# Patient Record
Sex: FEMALE | Race: WHITE | NOT HISPANIC OR LATINO | Employment: FULL TIME | URBAN - METROPOLITAN AREA
[De-identification: names, ages, dates, MRNs, and addresses within clinical notes are randomized per-mention and may not be internally consistent; named-entity substitution may affect disease eponyms.]

---

## 2024-01-29 ENCOUNTER — OFFICE VISIT (OUTPATIENT)
Dept: ORTHOPEDIC SURGERY | Facility: CLINIC | Age: 64
End: 2024-01-29
Payer: COMMERCIAL

## 2024-01-29 ENCOUNTER — HOSPITAL ENCOUNTER (OUTPATIENT)
Dept: RADIOLOGY | Facility: CLINIC | Age: 64
Discharge: HOME | End: 2024-01-29
Payer: COMMERCIAL

## 2024-01-29 DIAGNOSIS — M18.10 ARTHRITIS OF CARPOMETACARPAL (CMC) JOINT OF THUMB: ICD-10-CM

## 2024-01-29 PROCEDURE — L3924 HFO WITHOUT JOINTS PRE OTS: HCPCS | Performed by: ORTHOPAEDIC SURGERY

## 2024-01-29 PROCEDURE — 73140 X-RAY EXAM OF FINGER(S): CPT | Mod: RIGHT SIDE | Performed by: RADIOLOGY

## 2024-01-29 PROCEDURE — 2500000004 HC RX 250 GENERAL PHARMACY W/ HCPCS (ALT 636 FOR OP/ED): Performed by: ORTHOPAEDIC SURGERY

## 2024-01-29 PROCEDURE — 99204 OFFICE O/P NEW MOD 45 MIN: CPT | Performed by: ORTHOPAEDIC SURGERY

## 2024-01-29 PROCEDURE — 99214 OFFICE O/P EST MOD 30 MIN: CPT | Mod: 27 | Performed by: ORTHOPAEDIC SURGERY

## 2024-01-29 PROCEDURE — 73140 X-RAY EXAM OF FINGER(S): CPT | Mod: LEFT SIDE | Performed by: RADIOLOGY

## 2024-01-29 PROCEDURE — 20600 DRAIN/INJ JOINT/BURSA W/O US: CPT | Performed by: ORTHOPAEDIC SURGERY

## 2024-01-29 PROCEDURE — 1036F TOBACCO NON-USER: CPT | Performed by: ORTHOPAEDIC SURGERY

## 2024-01-29 PROCEDURE — 2500000005 HC RX 250 GENERAL PHARMACY W/O HCPCS: Performed by: ORTHOPAEDIC SURGERY

## 2024-01-29 PROCEDURE — 73140 X-RAY EXAM OF FINGER(S): CPT | Mod: LT

## 2024-01-29 PROCEDURE — 73140 X-RAY EXAM OF FINGER(S): CPT | Mod: RT

## 2024-01-29 RX ORDER — LIDOCAINE HYDROCHLORIDE 10 MG/ML
1 INJECTION INFILTRATION; PERINEURAL
Status: COMPLETED | OUTPATIENT
Start: 2024-01-29 | End: 2024-01-29

## 2024-01-29 RX ADMIN — TRIAMCINOLONE ACETONIDE 10 MG: 10 INJECTION, SUSPENSION INTRA-ARTICULAR; INTRALESIONAL at 09:47

## 2024-01-29 RX ADMIN — LIDOCAINE HYDROCHLORIDE 1 ML: 10 INJECTION, SOLUTION INFILTRATION; PERINEURAL at 09:47

## 2024-01-29 NOTE — PROGRESS NOTES
History present illness: Patient presents today for evaluation of bilateral basilar thumb pain.  She is an LPN.  She is right-hand dominant and otherwise healthy.  She describes profound pain and dysfunction.      Past medical history: The patient's past medical history, family history, social history, and review of systems were documented on the patient medical intake.  The updated data was reviewed in the electronic medical record.  History is negative except otherwise stated in history of present illness.        Physical examination:  General: Alert and oriented to person, place, and time.  No acute distress and breathing comfortably: Pleasant and cooperative with examination.  HEENT: Head is normocephalic and atraumatic.  Neck: Supple, no visible swelling.  Cardiovascular: No palpable tachycardia  Lungs: No audible wheezing or labored breathing  Abdomen: Nondistended.  Extremities: Evaluation of the bilateral upper extremities finds the patient had palpable radial artery at the wrists with brisk capillary refill to all digits.  Patient has intact sensation to axillary radial median and ulnar nerves.  There are no open wounds.  There are no signs of infection.  There is no evidence of lymphedema or lymphatic streaking.  The patient has supple compartments to bilateral arm forearm and hand.  Focal tenderness and swelling to bilateral thumb at CMC.      Radiology: X-rays demonstrate bilateral thumb CMC arthritis      Assessment: Bilateral thumb CMC arthritis      Plan: Treatment options were discussed.  Recommendations were made for steroid injection to bilateral thumb CMC joints and for intermittent bracing.  Plan for 6-week follow-up.  No x-rays necessary upon return.  Patient is agreeable with this strategy.        Procedure:                    Hand / UE Inj/Asp: bilateral thumb CMC for osteoarthritis on 1/29/2024 9:47 AM  Indications: pain  Details: 25 G needle, dorsal approach  Medications (Right): 10 mg  triamcinolone acetonide 10 mg/mL; 1 mL lidocaine 10 mg/mL (1 %)  Medications (Left): 1 mL lidocaine 10 mg/mL (1 %); 10 mg triamcinolone acetonide 10 mg/mL  Outcome: tolerated well, no immediate complications    Bilateral Thumb Carpometacarpal Joint Injection: It was explained to the patient that the risks of a steroid injection include but are not limited to infection, local skin irritation, skin atrophy, calcification, continued pain and discomfort, elevated blood sugar, burning, failure to relieve pain, and possible late infection. The patient verbalized good insight and verbalized consent for the injection. It was further explained that the postinjection discomfort can be alleviated with additional medications, ice, elevation, and rest over the first 24 hours, and that these modalities are recommended.    Using aseptic technique, a solution containing 0.5 cc of 5 mg of Kenalog and 0.5 mL of 1% lidocaine without epinephrine was injected intraarticularly to the right thumb carpometacarpal joint. Digital palpation was used to localize the CMC articulation about the dorsal radial aspect of the joint. Gentle traction was then imparted to the thumb distally and a 25-gauge needle was advanced through the skin, subcutaneous tissue and capsule. The injection was then administered and the patient tolerated the injection well. A band-aid was then placed. It should be noted that ethyl chloride spray was used to make the injection delivery more comfortable for the patient.    Using aseptic technique, a solution containing 0.5 cc of 5 mg of Kenalog and 0.5 mL of 1% lidocaine without epinephrine was injected intraarticularly to the left thumb carpometacarpal joint. Digital palpation was used to localize the CMC articulation about the dorsal radial aspect of the joint. Gentle traction was then imparted to the thumb distally and a 25-gauge needle was advanced through the skin, subcutaneous tissue and capsule. The injection was  then administered and the patient tolerated the injection well. A band-aid was then placed. It should be noted that ethyl chloride spray was used to make the injection delivery more comfortable for the patient.    Procedure, treatment alternatives, risks and benefits explained, specific risks discussed. Consent was given by the patient. Immediately prior to procedure a time out was called to verify the correct patient, procedure, equipment, support staff and site/side marked as required. Patient was prepped and draped in the usual sterile fashion.

## 2024-03-04 ENCOUNTER — OFFICE VISIT (OUTPATIENT)
Dept: ORTHOPEDIC SURGERY | Facility: CLINIC | Age: 64
End: 2024-03-04
Payer: COMMERCIAL

## 2024-03-04 DIAGNOSIS — M19.049 CMC ARTHRITIS: Primary | ICD-10-CM

## 2024-03-04 PROCEDURE — 99213 OFFICE O/P EST LOW 20 MIN: CPT | Performed by: ORTHOPAEDIC SURGERY

## 2024-03-04 PROCEDURE — 1036F TOBACCO NON-USER: CPT | Performed by: ORTHOPAEDIC SURGERY

## 2024-03-04 RX ORDER — METOPROLOL TARTRATE 25 MG/1
25 TABLET, FILM COATED ORAL 2 TIMES DAILY PRN
COMMUNITY
Start: 2022-08-17

## 2024-03-04 RX ORDER — ZINC GLUCONATE 50 MG
50 TABLET ORAL DAILY
COMMUNITY
Start: 2021-12-28

## 2024-03-04 RX ORDER — OMEPRAZOLE 40 MG/1
40 CAPSULE, DELAYED RELEASE ORAL
COMMUNITY
Start: 2018-01-16

## 2024-03-04 ASSESSMENT — PAIN SCALES - GENERAL: PAINLEVEL_OUTOF10: 4

## 2024-03-04 ASSESSMENT — PAIN - FUNCTIONAL ASSESSMENT: PAIN_FUNCTIONAL_ASSESSMENT: 0-10

## 2024-04-15 ENCOUNTER — OFFICE VISIT (OUTPATIENT)
Dept: ORTHOPEDIC SURGERY | Facility: CLINIC | Age: 64
End: 2024-04-15
Payer: COMMERCIAL

## 2024-04-15 DIAGNOSIS — M19.049 CMC ARTHRITIS: Primary | ICD-10-CM

## 2024-04-15 PROCEDURE — 99214 OFFICE O/P EST MOD 30 MIN: CPT | Performed by: ORTHOPAEDIC SURGERY

## 2024-04-15 PROCEDURE — 2500000005 HC RX 250 GENERAL PHARMACY W/O HCPCS: Performed by: ORTHOPAEDIC SURGERY

## 2024-04-15 PROCEDURE — 2500000004 HC RX 250 GENERAL PHARMACY W/ HCPCS (ALT 636 FOR OP/ED): Performed by: ORTHOPAEDIC SURGERY

## 2024-04-15 PROCEDURE — 1036F TOBACCO NON-USER: CPT | Performed by: ORTHOPAEDIC SURGERY

## 2024-04-15 PROCEDURE — 20600 DRAIN/INJ JOINT/BURSA W/O US: CPT | Performed by: ORTHOPAEDIC SURGERY

## 2024-04-15 RX ORDER — LIDOCAINE HYDROCHLORIDE 10 MG/ML
1 INJECTION INFILTRATION; PERINEURAL
Status: COMPLETED | OUTPATIENT
Start: 2024-04-15 | End: 2024-04-15

## 2024-04-15 RX ADMIN — LIDOCAINE HYDROCHLORIDE 1 ML: 10 INJECTION, SOLUTION INFILTRATION; PERINEURAL at 11:19

## 2024-04-15 RX ADMIN — TRIAMCINOLONE ACETONIDE 10 MG: 10 INJECTION, SUSPENSION INTRA-ARTICULAR; INTRALESIONAL at 11:19

## 2024-04-15 NOTE — PROGRESS NOTES
4/15/2024    Chief Complaint   Patient presents with    Left Hand - Follow-up     Lt thumb cmc s/p inj 1/29/24    Right Hand - Follow-up     Rt thumb cmc s/p inj 1/29/24       History of Present Illness:  Patient Sandy Garcia , 63 y.o. female, presents today, 4/15/2024, for evaluation of bilateral thumb pain and swelling.  Symptoms are relieved by rest and aggravated by use.  She states that she is inquiring about repeat bilateral thumb CMC injections today.  The last ones worked fairly well started to wear off around 6 weeks, and she was looking forward to repeating them around the 10-week nima.  She is considering surgical intervention but states now is not the right time, she would like to wait till the fall or early next year.  No new injury or trauma since last visit.       Review of Systems:   GENERAL: Negative  GI: Negative  MUSCULOSKELETAL: See HPI  SKIN: Negative  NEURO:  Negative     Physical Exam:  GENERAL:  Alert and oriented to person, place, and time.  No acute distress and breathing comfortably; pleasant and cooperative with the examination.  HEENT:  Head is normocephalic and atraumatic.  NECK:  Supple, no visible swelling.  CARDIOVASCULAR:  No palpable tachycardia.  LUNGS:  No audible wheezing or labored breathing.  ABDOMEN:  Nondistended.  Extremities: Evaluation of bilateral upper extremities finds the patient to have a palpable radial artery at the wrist with brisk capillary refill to all digits. The patient has intact sensorium to axillary, radial, median and ulnar nerves. There are no open wounds. There are no signs of infection. There is no evidence of lymphedema or lymphatic streaking. The patient has supple compartments of the bilateral arms, forearms and hands.  Tender to palpation over the bilateral thumbs with positive basilar grind test bilaterally.     Imaging/Test Results:  None today.     Assessment:  Bilateral thumb CMC osteoarthritis.     Plan:  Operative and nonoperative  treatment strategies were discussed at length.  Patient would like to forego surgical intervention in favor of continued nonoperative management at this time.  Recommendations are made for repeat Kenalog injections to the bilateral thumbs.  These were performed off today by Dr. Douglass and tolerated by patient.  Continue with activities to tolerance in the interim.  Continued bracing as needed.  Follow-up with our office again in 3 months for repeat clinical exam, no x-rays necessary upon return.  All questions answered at today's visit.    Hand / UE Inj/Asp: bilateral thumb CMC for osteoarthritis on 4/15/2024 11:19 AM  Indications: pain  Details: 25 G needle, dorsal approach  Medications (Right): 10 mg triamcinolone acetonide 10 mg/mL; 1 mL lidocaine 10 mg/mL (1 %)  Medications (Left): 1 mL lidocaine 10 mg/mL (1 %); 10 mg triamcinolone acetonide 10 mg/mL  Outcome: tolerated well, no immediate complications    Bilateral Thumb Carpometacarpal Joint Injection: It was explained to the patient that the risks of a steroid injection include but are not limited to infection, local skin irritation, skin atrophy, calcification, continued pain and discomfort, elevated blood sugar, burning, failure to relieve pain, and possible late infection. The patient verbalized good insight and verbalized consent for the injection. It was further explained that the postinjection discomfort can be alleviated with additional medications, ice, elevation, and rest over the first 24 hours, and that these modalities are recommended.    Using aseptic technique, a solution containing 0.5 cc of 5 mg of Kenalog and 0.5 mL of 1% lidocaine without epinephrine was injected intraarticularly to the right thumb carpometacarpal joint. Digital palpation was used to localize the CMC articulation about the dorsal radial aspect of the joint. Gentle traction was then imparted to the thumb distally and a 25-gauge needle was advanced through the skin,  subcutaneous tissue and capsule. The injection was then administered and the patient tolerated the injection well. A band-aid was then placed. It should be noted that ethyl chloride spray was used to make the injection delivery more comfortable for the patient.    Using aseptic technique, a solution containing 0.5 cc of 5 mg of Kenalog and 0.5 mL of 1% lidocaine without epinephrine was injected intraarticularly to the left thumb carpometacarpal joint. Digital palpation was used to localize the CMC articulation about the dorsal radial aspect of the joint. Gentle traction was then imparted to the thumb distally and a 25-gauge needle was advanced through the skin, subcutaneous tissue and capsule. The injection was then administered and the patient tolerated the injection well. A band-aid was then placed. It should be noted that ethyl chloride spray was used to make the injection delivery more comfortable for the patient.    Procedure, treatment alternatives, risks and benefits explained, specific risks discussed. Consent was given by the patient. Immediately prior to procedure a time out was called to verify the correct patient, procedure, equipment, support staff and site/side marked as required. Patient was prepped and draped in the usual sterile fashion.         In a face to face encounter, I performed a history and physical examination, discussed pertinent diagnostic studies if indicated, and discussed diagnosis and management strategies with both the patient and the mid-level provider. I reviewed the mid-level's note and agree with the documented findings and plan of care.  Patient presents today for evaluation of bilateral thumb CMC arthritis.  Focal tenderness to bilateral thumb CMC joints.  Positive basilar grind test bilaterally.  Treatment options were discussed.  We talked about operative and nonoperative strategies.  Patient elects for steroid injection and 3-month follow-up.  She is leaning toward surgical  solutions by end of the year.  No x-rays upon return to the office.

## 2024-07-06 NOTE — PROGRESS NOTES
3/4/2024    Chief Complaint   Patient presents with    Left Hand - Follow-up, Pain     Thumb cmc s/p inj 01/29/24    Right Hand - Follow-up, Pain     Thumb cmc s/p inj 01/29/24       History of Present Illness:  Patient Sandy Garcia , 63 y.o. female, presents today, 3/4/2024, for evaluation of bilateral thumb pain.  Sandy underwent bilateral thumb CMC injections about 6 weeks ago.  They did help with her pain and discomfort, she is wearing braces at home, but symptoms are incompletely resolved.  She states that motion is less painful, but anytime she puts direct pressure on the thumbs at these joints she still has discomfort.  She is considering surgical intervention at some point, she would not be able to get this coordinated until sometime next year due to the downtime required from work.       Review of Systems:   GENERAL: Negative  GI: Negative  MUSCULOSKELETAL: See HPI  SKIN: Negative  NEURO:  Negative     Physical Exam:  GENERAL:  Alert and oriented to person, place, and time.  No acute distress and breathing comfortably; pleasant and cooperative with the examination.  HEENT:  Head is normocephalic and atraumatic.  NECK:  Supple, no visible swelling.  CARDIOVASCULAR:  No palpable tachycardia.  LUNGS:  No audible wheezing or labored breathing.  ABDOMEN:  Nondistended.  Extremities: Evaluation of bilateral upper extremities finds the patient to have a palpable radial artery at the wrist with brisk capillary refill to all digits. The patient has intact sensorium to axillary, radial, median and ulnar nerves. There are no open wounds. There are no signs of infection. There is no evidence of lymphedema or lymphatic streaking. The patient has supple compartments of the bilateral arms, forearms and hands.  Tenderness over the bilateral thumb CMC articulations.     Imaging/Test Results:  None today.  Previous radiographs in the  PACS system show evidence of serial bilateral thumb CMC osteoarthritis.      Assessment:  Bilateral thumb CMC osteoarthritis with incomplete resolution of symptoms after injections.     Plan:  Operative and nonoperative treatment strategies were reviewed at length.  Patient elects for continued nonoperative management at this time.  We recommend for continued observation for now, bracing in the interim, and follow-up again in 6 weeks to discuss possibility of repeat injection at that time.  All questions answered at today's visit.    In a face to face encounter, I performed a history and physical examination, discussed pertinent diagnostic studies if indicated, and discussed diagnosis and management strategies with both the patient and the mid-level provider. I reviewed the mid-level's note and agree with the documented findings and plan of care.    Patient presents today for evaluation of symptomatic bilateral thumb CMC arthritis.  Previous injections helped but not enough.  Exam shows positive basilar grind test to the bilateral thumbs.  Treatment options were discussed.  Patient elects for 6-week follow-up for repeat trial of steroid injection.  She is considering surgical solutions but would not do so into the end of this year.  No x-rays upon return.   impaired

## 2024-07-15 ENCOUNTER — OFFICE VISIT (OUTPATIENT)
Dept: ORTHOPEDIC SURGERY | Facility: CLINIC | Age: 64
End: 2024-07-15
Payer: COMMERCIAL

## 2024-07-15 DIAGNOSIS — M19.049 CMC ARTHRITIS: Primary | ICD-10-CM

## 2024-07-15 PROCEDURE — 1036F TOBACCO NON-USER: CPT | Performed by: ORTHOPAEDIC SURGERY

## 2024-07-15 PROCEDURE — 99214 OFFICE O/P EST MOD 30 MIN: CPT | Performed by: ORTHOPAEDIC SURGERY

## 2024-07-15 PROCEDURE — 20600 DRAIN/INJ JOINT/BURSA W/O US: CPT | Mod: LT | Performed by: ORTHOPAEDIC SURGERY

## 2024-07-15 PROCEDURE — 2500000004 HC RX 250 GENERAL PHARMACY W/ HCPCS (ALT 636 FOR OP/ED): Performed by: ORTHOPAEDIC SURGERY

## 2024-07-15 PROCEDURE — 2500000005 HC RX 250 GENERAL PHARMACY W/O HCPCS: Performed by: ORTHOPAEDIC SURGERY

## 2024-07-15 PROCEDURE — 20600 DRAIN/INJ JOINT/BURSA W/O US: CPT | Mod: RT | Performed by: ORTHOPAEDIC SURGERY

## 2024-07-15 RX ORDER — LIDOCAINE HYDROCHLORIDE 10 MG/ML
0.5 INJECTION INFILTRATION; PERINEURAL
Status: COMPLETED | OUTPATIENT
Start: 2024-07-15 | End: 2024-07-15

## 2024-07-15 NOTE — PROGRESS NOTES
7/15/2024    Chief Complaint   Patient presents with    Left Hand - Pain, Osteoarthritis     Thumb cmc arthritis  S/p inj 5/14/24    Right Hand - Pain, Osteoarthritis     thumb cmc arthritis  S/p inj 5/14/24       History of Present Illness:  Patient Sandy Garcia , 63 y.o. female, presents today, 7/15/2024, for evaluation of bilateral thumb pain.  3 months out from the last thumb CMC injections bilaterally.  She is requesting repeat injection today.  Symptoms on the right are worse than on the left.  She is thoughtfully considering surgery at some point in the near future likely later this fall or winter near the end of the year.  She states now she is not the right time.  Denies any new injury or trauma.       Review of Systems:   GENERAL: Negative  GI: Negative  MUSCULOSKELETAL: See HPI  SKIN: Negative  NEURO:  Negative     Physical Exam:  GENERAL:  Alert and oriented to person, place, and time.  No acute distress and breathing comfortably; pleasant and cooperative with the examination.  HEENT:  Head is normocephalic and atraumatic.  NECK:  Supple, no visible swelling.  CARDIOVASCULAR:  No palpable tachycardia.  LUNGS:  No audible wheezing or labored breathing.  ABDOMEN:  Nondistended.  Extremities: Evaluation of bilateral upper extremities finds the patient to have a palpable radial artery at the wrist with brisk capillary refill to all digits. The patient has intact sensorium to axillary, radial, median and ulnar nerves. There are no open wounds. There are no signs of infection. There is no evidence of lymphedema or lymphatic streaking. The patient has supple compartments of the bilateral arms, forearms and hands.  She has tenderness palpation of the bilateral thumb CMC articulations with positive basilar grind test bilaterally.     Imaging/Test Results:  None today, previous radiographs showed significant bilateral thumb CMC osteoarthritic change.     Assessment:  Bilateral thumb CMC osteoarthritis.      Plan:  Treatment options were discussed including operative and nonoperative treatment strategies.  Patient is thoughtfully considering surgical intervention, but states now is not a good time.  She ultimately elects for repeat Kenalog injections today.  These were performed in office today by Dr. Douglass and tolerated well by patient.  Continue with activities to tolerance and bracing as needed in the interim.  Follow-up again in 3 months for repeat clinical exam.  We discussed possibility of repeat injection or moving forward with surgical intervention at that time.  All questions answered at today's visit.    Hand / UE Inj/Asp: R thumb CMC for osteoarthritis on 7/15/2024 9:18 AM  Indications: pain  Details: 25 G needle, dorsal approach  Medications: 5 mg triamcinolone acetonide 10 mg/mL; 0.5 mL lidocaine 10 mg/mL (1 %)  Outcome: tolerated well, no immediate complications    Right Thumb Carpometacarpal Joint Injection: It was explained to the patient that the risks of a steroid injection include but are not limited to infection, local skin irritation, skin atrophy, calcification, continued pain and discomfort, elevated blood sugar, burning, failure to relieve pain, and possible late infection. The patient verbalized good insight and verbalized consent for the injection. It was further explained that the postinjection discomfort can be alleviated with additional medications, ice, elevation, and rest over the first 24 hours, and that these modalities are recommended.    Using aseptic technique, a solution containing 0.5 cc of 5 mg of Kenalog and 0.5 mL of 1% lidocaine without epinephrine was injected intraarticularly to the right thumb carpometacarpal joint. Digital palpation was used to localize the CMC articulation about the dorsal radial aspect of the joint. Gentle traction was then imparted to the thumb distally and a 25-gauge needle was advanced through the skin, subcutaneous tissue and capsule. The injection  was then administered and the patient tolerated the injection well. A band-aid was then placed. It should be noted that ethyl chloride spray was used to make the injection delivery more comfortable for the patient.  Procedure, treatment alternatives, risks and benefits explained, specific risks discussed. Consent was given by the patient. Immediately prior to procedure a time out was called to verify the correct patient, procedure, equipment, support staff and site/side marked as required. Patient was prepped and draped in the usual sterile fashion.       Hand / UE Inj/Asp: L thumb CMC for osteoarthritis on 7/15/2024 9:19 AM  Indications: pain  Details: 25 G needle, dorsal approach  Medications: 5 mg triamcinolone acetonide 10 mg/mL; 0.5 mL lidocaine 10 mg/mL (1 %)  Outcome: tolerated well, no immediate complications    Left Thumb Carpometacarpal Joint Injection: It was explained to the patient that the risks of a steroid injection include but are not limited to infection, local skin irritation, skin atrophy, calcification, continued pain and discomfort, elevated blood sugar, burning, failure to relieve pain, and possible late infection. The patient verbalized good insight and verbalized consent for the injection. It was further explained that the postinjection discomfort can be alleviated with additional medications, ice, elevation, and rest over the first 24 hours, and that these modalities are recommended.    Using aseptic technique, a solution containing 0.5 cc of 5 mg of Kenalog and 0.5 mL of 1% lidocaine without epinephrine was injected intraarticularly to the left thumb carpometacarpal joint. Digital palpation was used to localize the CMC articulation about the dorsal radial aspect of the joint. Gentle traction was then imparted to the thumb distally and a 25-gauge needle was advanced through the skin, subcutaneous tissue and capsule. The injection was then administered and the patient tolerated the injection  well. A band-aid was then placed. It should be noted that ethyl chloride spray was used to make the injection delivery more comfortable for the patient.  Procedure, treatment alternatives, risks and benefits explained, specific risks discussed. Consent was given by the patient. Immediately prior to procedure a time out was called to verify the correct patient, procedure, equipment, support staff and site/side marked as required. Patient was prepped and draped in the usual sterile fashion.         In a face to face encounter, I performed a history and physical examination, discussed pertinent diagnostic studies if indicated, and discussed diagnosis and management strategies with both the patient and the mid-level provider. I reviewed the mid-level's note and agree with the documented findings and plan of care.  Patient presents today for evaluation of symptomatic bilateral thumb CMC arthritis.  Focal tenderness to bilateral thumb CMC joints on today's exam.  Treatment options were discussed.  We talked about operative and nonoperative strategies.  We talked about intraoperative techniques and postoperative protocols for CMC arthroplasty.  Patient is weighing her options.  She is considering surgery.  Patient elects for steroid injection to the bilateral thumb at CMC joint and follow-up with me in 3 months.  No x-rays upon return.

## 2024-08-19 ENCOUNTER — OFFICE VISIT (OUTPATIENT)
Dept: ORTHOPEDIC SURGERY | Facility: CLINIC | Age: 64
End: 2024-08-19
Payer: COMMERCIAL

## 2024-08-19 DIAGNOSIS — M19.049 CMC ARTHRITIS: ICD-10-CM

## 2024-08-19 DIAGNOSIS — M18.10 ARTHRITIS OF CARPOMETACARPAL (CMC) JOINT OF THUMB: Primary | ICD-10-CM

## 2024-08-19 PROCEDURE — 1036F TOBACCO NON-USER: CPT | Performed by: ORTHOPAEDIC SURGERY

## 2024-08-19 PROCEDURE — 99214 OFFICE O/P EST MOD 30 MIN: CPT | Performed by: ORTHOPAEDIC SURGERY

## 2024-08-19 NOTE — PROGRESS NOTES
History present illness: Patient presents today for evaluation of her bilateral thumb pain.  She wants to focus on the right today.  Previous steroid injection did not bring adequate relief.  She has pain and dysfunction on a daily basis.  Right-hand-dominant.  History of palpitations.      Past medical history: The patient's past medical history, family history, social history, and review of systems were documented on the patient medical intake.  The updated data was reviewed in the electronic medical record.  History is negative except otherwise stated in history of present illness.        Physical examination:  General: Alert and oriented to person, place, and time.  No acute distress and breathing comfortably: Pleasant and cooperative with examination.  HEENT: Head is normocephalic and atraumatic.  Neck: Supple, no visible swelling.  Cardiovascular: No palpable tachycardia  Lungs: No audible wheezing or labored breathing  Abdomen: Nondistended.  Extremities: Evaluation of the right upper extremity finds the patient had palpable radial artery at the wrist with brisk capillary refill to all digits.  Patient has intact sensation to axillary radial median and ulnar nerves.  There are no open wounds.  There are no signs of infection.  There is no evidence of lymphedema or lymphatic streaking.  The patient has supple compartments to right arm forearm and hand.  Focal tenderness right thumb at CMC articulation.  Positive basilar grind test.      Radiology:      Assessment: Severe right thumb CMC arthritis with failure of nonoperative treatment strategies      Plan: Treatment options were discussed.  We talked about operative and nonoperative strategies.  We talked about intraoperative techniques and postoperative protocols.  Patient elects to proceed forth with surgery by way of right thumb CMC arthroplasty.  She has a preference for Norco over Percocet for postoperative pain control.  We will seek clearance through  PCP and cardiologist.  Target for surgery is October 2024.        Procedure:

## 2024-08-22 ENCOUNTER — TELEPHONE (OUTPATIENT)
Dept: ORTHOPEDIC SURGERY | Facility: CLINIC | Age: 64
End: 2024-08-22
Payer: COMMERCIAL

## 2024-08-22 NOTE — TELEPHONE ENCOUNTER
Called patient to schedule recommended surgery with Dr. Douglass, call went to voicemail at which time a message was left. A AMEE message was also sent at this time.

## 2024-09-18 ENCOUNTER — LAB (OUTPATIENT)
Dept: LAB | Facility: LAB | Age: 64
End: 2024-09-18
Payer: COMMERCIAL

## 2024-09-18 ENCOUNTER — APPOINTMENT (OUTPATIENT)
Dept: CARDIOLOGY | Facility: CLINIC | Age: 64
End: 2024-09-18
Payer: COMMERCIAL

## 2024-09-18 VITALS — HEART RATE: 62 BPM

## 2024-09-18 DIAGNOSIS — Z01.818 PREOP TESTING: Primary | ICD-10-CM

## 2024-09-18 DIAGNOSIS — Z01.818 PRE-OP EXAM: ICD-10-CM

## 2024-09-18 LAB
ALBUMIN SERPL BCP-MCNC: 4.1 G/DL (ref 3.4–5)
ALP SERPL-CCNC: 72 U/L (ref 33–136)
ALT SERPL W P-5'-P-CCNC: 12 U/L (ref 7–45)
ANION GAP SERPL CALC-SCNC: 8 MMOL/L (ref 10–20)
AST SERPL W P-5'-P-CCNC: 18 U/L (ref 9–39)
BASOPHILS # BLD AUTO: 0.03 X10*3/UL (ref 0–0.1)
BASOPHILS NFR BLD AUTO: 0.6 %
BILIRUB SERPL-MCNC: 0.4 MG/DL (ref 0–1.2)
BUN SERPL-MCNC: 12 MG/DL (ref 6–23)
CALCIUM SERPL-MCNC: 9.2 MG/DL (ref 8.6–10.3)
CHLORIDE SERPL-SCNC: 104 MMOL/L (ref 98–107)
CO2 SERPL-SCNC: 30 MMOL/L (ref 21–32)
CREAT SERPL-MCNC: 0.63 MG/DL (ref 0.5–1.05)
EGFRCR SERPLBLD CKD-EPI 2021: >90 ML/MIN/1.73M*2
EOSINOPHIL # BLD AUTO: 0.12 X10*3/UL (ref 0–0.7)
EOSINOPHIL NFR BLD AUTO: 2.4 %
ERYTHROCYTE [DISTWIDTH] IN BLOOD BY AUTOMATED COUNT: 14.4 % (ref 11.5–14.5)
GLUCOSE SERPL-MCNC: 88 MG/DL (ref 74–99)
HCT VFR BLD AUTO: 36.9 % (ref 36–46)
HGB BLD-MCNC: 11.7 G/DL (ref 12–16)
IMM GRANULOCYTES # BLD AUTO: 0.01 X10*3/UL (ref 0–0.7)
IMM GRANULOCYTES NFR BLD AUTO: 0.2 % (ref 0–0.9)
LYMPHOCYTES # BLD AUTO: 2.01 X10*3/UL (ref 1.2–4.8)
LYMPHOCYTES NFR BLD AUTO: 40.9 %
MCH RBC QN AUTO: 29.3 PG (ref 26–34)
MCHC RBC AUTO-ENTMCNC: 31.7 G/DL (ref 32–36)
MCV RBC AUTO: 93 FL (ref 80–100)
MONOCYTES # BLD AUTO: 0.34 X10*3/UL (ref 0.1–1)
MONOCYTES NFR BLD AUTO: 6.9 %
NEUTROPHILS # BLD AUTO: 2.4 X10*3/UL (ref 1.2–7.7)
NEUTROPHILS NFR BLD AUTO: 49 %
NRBC BLD-RTO: 0 /100 WBCS (ref 0–0)
PLATELET # BLD AUTO: 286 X10*3/UL (ref 150–450)
POTASSIUM SERPL-SCNC: 4.3 MMOL/L (ref 3.5–5.3)
PROT SERPL-MCNC: 6.2 G/DL (ref 6.4–8.2)
RBC # BLD AUTO: 3.99 X10*6/UL (ref 4–5.2)
SODIUM SERPL-SCNC: 138 MMOL/L (ref 136–145)
WBC # BLD AUTO: 4.9 X10*3/UL (ref 4.4–11.3)

## 2024-09-18 PROCEDURE — 80053 COMPREHEN METABOLIC PANEL: CPT

## 2024-09-18 PROCEDURE — 85025 COMPLETE CBC W/AUTO DIFF WBC: CPT

## 2024-09-18 PROCEDURE — 36415 COLL VENOUS BLD VENIPUNCTURE: CPT

## 2024-09-18 NOTE — PROGRESS NOTES
Patient presented in office today for EKG visit ordered by Martha Mckeon PA-C for pre op exam (Rt thumb PO SX 10/4 ASC). EKG read by in office physician Dr. Naima Montano MD, [facility]. No previous EKG to compare. No orders given. EKG scanned into chart.

## 2024-10-02 DIAGNOSIS — G89.18 POST-OP PAIN: Primary | ICD-10-CM

## 2024-10-03 RX ORDER — TRAMADOL HYDROCHLORIDE 50 MG/1
50 TABLET ORAL EVERY 8 HOURS
Qty: 20 TABLET | Refills: 0 | Status: SHIPPED | OUTPATIENT
Start: 2024-10-03 | End: 2024-10-10

## 2024-10-04 PROCEDURE — 25310 TRANSPLANT FOREARM TENDON: CPT | Performed by: ORTHOPAEDIC SURGERY

## 2024-10-04 PROCEDURE — 25447 ARTHRP NTRCRP/CRP/MTCR NTRPS: CPT | Performed by: PHYSICIAN ASSISTANT

## 2024-10-04 PROCEDURE — 25447 ARTHRP NTRCRP/CRP/MTCR NTRPS: CPT | Performed by: ORTHOPAEDIC SURGERY

## 2024-10-04 PROCEDURE — 25310 TRANSPLANT FOREARM TENDON: CPT | Performed by: PHYSICIAN ASSISTANT

## 2024-10-17 ENCOUNTER — HOSPITAL ENCOUNTER (OUTPATIENT)
Dept: RADIOLOGY | Facility: CLINIC | Age: 64
Discharge: HOME | End: 2024-10-17
Payer: COMMERCIAL

## 2024-10-17 ENCOUNTER — OFFICE VISIT (OUTPATIENT)
Dept: ORTHOPEDIC SURGERY | Facility: CLINIC | Age: 64
End: 2024-10-17
Payer: COMMERCIAL

## 2024-10-17 ENCOUNTER — EVALUATION (OUTPATIENT)
Dept: OCCUPATIONAL THERAPY | Facility: CLINIC | Age: 64
End: 2024-10-17
Payer: COMMERCIAL

## 2024-10-17 DIAGNOSIS — M19.049 CMC ARTHRITIS: Primary | ICD-10-CM

## 2024-10-17 DIAGNOSIS — M19.049 CMC ARTHRITIS: ICD-10-CM

## 2024-10-17 DIAGNOSIS — M18.11 OSTEOARTHRITIS OF CARPOMETACARPAL (CMC) JOINT OF RIGHT THUMB: Primary | ICD-10-CM

## 2024-10-17 PROCEDURE — 73140 X-RAY EXAM OF FINGER(S): CPT | Mod: RIGHT SIDE | Performed by: ORTHOPAEDIC SURGERY

## 2024-10-17 PROCEDURE — 99024 POSTOP FOLLOW-UP VISIT: CPT | Performed by: ORTHOPAEDIC SURGERY

## 2024-10-17 PROCEDURE — 73140 X-RAY EXAM OF FINGER(S): CPT | Mod: RT

## 2024-10-17 PROCEDURE — 1036F TOBACCO NON-USER: CPT | Performed by: ORTHOPAEDIC SURGERY

## 2024-10-17 PROCEDURE — L3808 WHFO, RIGID W/O JOINTS: HCPCS | Performed by: OCCUPATIONAL THERAPIST

## 2024-10-17 PROCEDURE — 99211 OFF/OP EST MAY X REQ PHY/QHP: CPT | Performed by: ORTHOPAEDIC SURGERY

## 2024-10-17 PROCEDURE — 97165 OT EVAL LOW COMPLEX 30 MIN: CPT | Mod: GO | Performed by: OCCUPATIONAL THERAPIST

## 2024-10-17 ASSESSMENT — PAIN SCALES - GENERAL: PAINLEVEL_OUTOF10: 2

## 2024-10-17 ASSESSMENT — PAIN - FUNCTIONAL ASSESSMENT: PAIN_FUNCTIONAL_ASSESSMENT: 0-10

## 2024-10-17 ASSESSMENT — PAIN DESCRIPTION - DESCRIPTORS: DESCRIPTORS: SHARP

## 2024-10-17 NOTE — PROGRESS NOTES
10/17/2024    Chief Complaint   Patient presents with    Right Thumb - Post-op     Cmc arthroplasty  DOS: 10/4/24  Xrays today        History of Present Illness:  Patient Sandy Garcia , 63 y.o. female, presents today, 10/17/2024, for evaluation of right thumb  CMC arthroplasty, 2 weeks post-op .  She is done well over the last 2 weeks in surgery.  Denies any fevers, chills, constitutional symptoms.  She kept her postoperative splint on clean, dry, intact.  She has been icing and elevating.  Minimal amount of discomfort that she has been able to control well with pain medication.       Review of Systems:   GENERAL: Negative  GI: Negative  MUSCULOSKELETAL: See HPI  SKIN: Negative  NEURO:  Negative     Physical Exam:  GENERAL:  Alert and oriented to person, place, and time.  No acute distress and breathing comfortably; pleasant and cooperative with the examination.  HEENT:  Head is normocephalic and atraumatic.  NECK:  Supple, no visible swelling.  CARDIOVASCULAR:  No palpable tachycardia.  LUNGS:  No audible wheezing or labored breathing.  ABDOMEN:  Nondistended.  Extremities: The surgical incision is clean, dry, intact, and appears to be healing well.  No active bleeding, erythema, warmth, drainage, or signs of infection.  Appropriate functional ROM demonstrated with flexion/extension of the digits, and flexion/extension/pronosupination of the wrist.     Imaging/Test Results:  3 views of the thumb show no acute fracture or dislocation.  Previous surgical resection of trapezium is noted.  CMC arthroplasty construct has good alignment.  No evidence of metacarpal subsidence.     Assessment:  Right thumb CMC arthroplasty, 2 weeks postop.     Plan:  Sutures were removed in the office today.  The patient will have restrictions of nonweightbearing to the right upper extremity.  We discussed and reviewed home exercise program for range of motion recovery, scar massage, and desensitization techniques.  Occupational  Therapy referral order was provided for custom splint fabrication and CMC rehab protocols .  The patient will follow up with our office in 4 weeks, repeat x-rays 3 views of the right thumb upon return.  All patient questions answered at today's visit.    Martha Mckeon PA-C

## 2024-10-17 NOTE — PROGRESS NOTES
"Occupational Therapy    Occupational Therapy Evaluation    Name: Sandy Garcia  MRN: [id number]  : 1960   DATE: 10/17/24   Time Calculation  Start Time: 1105  Stop Time: 1145  Time Calculation (min): 40 min     OT Evaluation Time Entry  OT Evaluation (Low) Time Entry: 20        Insurance Authorization Request: N[employer] RETIREE 60V PT OT COPAY 0 DED 0,COVERAGE 100 OOP 0 NO AUTH REQ    Assessment:  Patient is a 63 y.o. female who is 13 days s/p right CMC arthroplasty surgery.     Patient presented with pain, edema, joint stiffness, and muscle weakness. 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 Meaningful leisure activities are flower arranging and crafts. Fabricated patient a forearm volar based thumb spica orthosis to protect surgical procedure. Patient will benefit from skilled OT for pain/edema management, ROM, and later progress to strengthening to support return to all ADL/ IADL, work, and leisure activities.     Plan:  Outpatient Plan  OT Plan: Modalities, therapeutic exercise, therapeutic activity, manual therapy, neuromotor re-ed  Frequency: 1x/wk  Duration: 6 weeks  Rehab Potential: Good  Plan of Care Agreement: Patient    Subjective   Current Problem:  Right CMC OA       DOI: Summer 2023  Surgical Procedure: right CMC arthroplasty   DOS: 10/04/2024  Hand Dominance: right   Affected Extremity: right    Mechanism of Injury: Pain in basal thumb from CMC OA since the summer of last year.     Precautions: No heavy lifting, No WB    Pain Assessment:  Location: right basal thumb   2/10 at rest, 6/10 at highest  Description: sharp      Homeliving/Social Support: Living home alone     Work: Licensed practical nurse     Meaningful Activities: Flower arranging and crafts     Previous Level of Function Per Patient/Caregiver Report: Independent with ADL, IADL, work and leisure activities    Chief complaint: Pain     Patient stated goals for therapy: \"Get this hand back to " "normal\"      Objective   UE Assessment  Observation: Mod edema presented in left basal thumb. Sutures removed prior to therapy session. No sign of infection.     Palpation: n/a    Range of Motion (in degrees)  Shoulder AROM: WNL     PROM: WNL    Elbow AROM: WNL     PROM: WNL    Wrist AROM: WFL    PROM: TBT    Digits AROM: Full composite fist   PROM: TBT    Thumb AROM: MCP 0/30, IP 0/30, Opposition to MF fingertip    PROM: TBT     Sensation: Numbness and tingling in whole hand sometimes at night     Strength: TBT   strength: R  L  Lateral pinch strength: R  L  3 point pinch strength: R  L     Treatment Performed: Fabricating forearm volar based thumb spica orthosis - wrist in neutral, thumb in palmar ABD, thumb MCP in slight flexion. Thumb IP is free. Distal palmar crease stays clear. Orthosis is secured with velcro.     Outcome Measures:  Quick Dash Score: at eval 63.64%    OP EDUCATION:  Education  Individual(s) Educated: Patient  Education Provided: Anatomy & Physiology, Orthotics wearing schedule and precautions, Orthotics and/or prosthetic trainging, Risk and benefits of OT discussed with patient or other, POC discussed and agreed upon  Home Program: Orthotic wearing schedule, care and precautions  Risk and Benefits Discussed with Patient/Caregiver/Other: yes  Patient/Caregiver Demonstrated Understanding: yes  Plan of Care Discussed and Agreed Upon: yes  Patient Response to Education: Patient/Caregiver Verbalized Understanding of Information, Patient/Caregiver Performed Return Demonstration of Exercises/Activities, Patient/Caregiver Asked Appropriate Questions     Goals:  By discharge (6 weeks) Sandy Garcia  will achieve the following goals:     1. Patient to demonstrate AROM thumb opposition to SF basal crease for dressing and grooming  2. Patient to report pain 0/10 at rest for sleeping  3. Patient to lift and carry 10# with right hand with no difficulty for work tasks  4. Patient to demonstrate proper " technique and competence with HEP   5. Patient to score disability rate less than 10% on Quick DASH

## 2024-10-21 ENCOUNTER — APPOINTMENT (OUTPATIENT)
Dept: ORTHOPEDIC SURGERY | Facility: CLINIC | Age: 64
End: 2024-10-21
Payer: COMMERCIAL

## 2024-10-25 ENCOUNTER — TREATMENT (OUTPATIENT)
Dept: OCCUPATIONAL THERAPY | Facility: CLINIC | Age: 64
End: 2024-10-25
Payer: COMMERCIAL

## 2024-10-25 DIAGNOSIS — M18.11 OSTEOARTHRITIS OF CARPOMETACARPAL (CMC) JOINT OF RIGHT THUMB: Primary | ICD-10-CM

## 2024-10-25 PROCEDURE — 97110 THERAPEUTIC EXERCISES: CPT | Mod: GO | Performed by: OCCUPATIONAL THERAPIST

## 2024-10-25 PROCEDURE — 97140 MANUAL THERAPY 1/> REGIONS: CPT | Mod: GO | Performed by: OCCUPATIONAL THERAPIST

## 2024-10-25 ASSESSMENT — PAIN DESCRIPTION - DESCRIPTORS: DESCRIPTORS: SHARP

## 2024-10-25 ASSESSMENT — PAIN - FUNCTIONAL ASSESSMENT: PAIN_FUNCTIONAL_ASSESSMENT: 0-10

## 2024-10-25 ASSESSMENT — PAIN SCALES - GENERAL: PAINLEVEL_OUTOF10: 2

## 2024-10-25 NOTE — PROGRESS NOTES
"  Occupational Therapy     Occupational Therapy Treatment  Name: Sandy Garcia   MRN: [id number]   : 1960   Date:  10/25/2024    Time Calculation  Start Time: 1145  Stop Time: 1225  Time Calculation (min): 40 min      OT Therapeutic Procedures Time Entry  Manual Therapy Time Entry: 10  Therapeutic Exercise Time Entry: 30      Current Problem:   Right CMC OA     DOI: Summer 2023  Surgical Procedure: right CMC arthroplasty   DOS: 10/04/2024    Visit Number:2     Insurance Authorization Request: [employer] RETIREE 60V PT OT COPAY 0 DED 0,COVERAGE 100 OOP 0 NO AUTH REQ     Assessment:  Patient is progressing appropriately demonstrated by improved AROM of thumb IP into flexion. She remains limited by pain, joint stiffness, capsular tightness, and muscle weakness. Added thumb AROM and wrist AAROM exercises to today's session to decrease stiffness and tightness. Patient will continue to benefit from skilled OT for pain management, ROM, and later progress to strengthening to support full return to all ADL/IADL, work, and leisure activities.       Plan:  Outpatient Plan  OT Plan: Add functional simulation tasks  Frequency: 1x/wk  Duration: 6 weeks  Rehab Potential: Good  Plan of Care Agreement: Patient      Subjective   General: \"I leave the brace off when I am not doing much at home. I feel like I am getting better but it still hurts when I dress myself.\"     Precautions: No heavy lifting, No WB     Pain Assessment:  Location: right basal thumb   2/10 at rest, 7/10 at highest  Description: sharp      HEP Adherence/Understanding: Good     Objective  UE Assessment  Observation: Mod edema presented in left basal thumb. Sutures removed prior to therapy session. No sign of infection.      Palpation: n/a     Range of Motion (in degrees)  Shoulder AROM: WNL     PROM: WNL     Elbow AROM: WNL     PROM: WNL     Wrist AROM: WFL    PROM: TBT     Digits AROM: Full composite fist   PROM: TBT     Thumb AROM: MCP 0/30, IP 0/40 (was " 30), Opposition to MF fingertip    PROM: TBT      Sensation: Numbness and tingling in whole hand sometimes at night     Strength: TBT   strength: R  L  Lateral pinch strength: R  L  3 point pinch strength: R  L      Treatment Interventions:   Therapeutic Exercise  Therapeutic Exercise Performed: Yes    Instructed patient to self perform active sign language in conjunction with fluido therapy  Demonstrated and instructed patient to self perform AAROM wrist flex/ext with ball roll; AROM thumb flexion, radial/palmar ABD, opposition     Manual Therapy Performed: Yes    Therapist completed retrograde edema mob along thumb and 1st web space     Tolerance of session: No difficulty     Outcome Measures:  Quick Dash Score: at eval 63.64%     OP EDUCATION:  Education  Individual(s) Educated: Patient  Home Program: AROM  Risk and Benefits Discussed with Patient/Caregiver/Other: yes  Patient/Caregiver Demonstrated Understanding: yes  Plan of Care Discussed and Agreed Upon: yes  Patient Response to Education: Patient/Caregiver Verbalized Understanding of Information, Patient/Caregiver Performed Return Demonstration of Exercises/Activities, Patient/Caregiver Asked Appropriate Questions    Goals:   1. Patient to demonstrate AROM thumb opposition to SF basal crease for dressing and grooming  2. Patient to report pain 0/10 at rest for sleeping  3. Patient to lift and carry 10# with right hand with no difficulty for work tasks  4. Patient to demonstrate proper technique and competence with HEP   5. Patient to score disability rate less than 10% on Quick DASH

## 2024-11-01 ENCOUNTER — TREATMENT (OUTPATIENT)
Dept: OCCUPATIONAL THERAPY | Facility: CLINIC | Age: 64
End: 2024-11-01
Payer: COMMERCIAL

## 2024-11-01 DIAGNOSIS — M18.11 PRIMARY OSTEOARTHRITIS OF FIRST CARPOMETACARPAL JOINT OF RIGHT HAND: Primary | ICD-10-CM

## 2024-11-01 PROCEDURE — 97530 THERAPEUTIC ACTIVITIES: CPT | Mod: GO | Performed by: OCCUPATIONAL THERAPIST

## 2024-11-01 PROCEDURE — 97110 THERAPEUTIC EXERCISES: CPT | Mod: GO | Performed by: OCCUPATIONAL THERAPIST

## 2024-11-01 ASSESSMENT — PAIN - FUNCTIONAL ASSESSMENT: PAIN_FUNCTIONAL_ASSESSMENT: 0-10

## 2024-11-01 ASSESSMENT — PAIN SCALES - GENERAL: PAINLEVEL_OUTOF10: 1

## 2024-11-01 ASSESSMENT — PAIN DESCRIPTION - DESCRIPTORS: DESCRIPTORS: SHARP

## 2024-11-08 ENCOUNTER — TREATMENT (OUTPATIENT)
Dept: OCCUPATIONAL THERAPY | Facility: CLINIC | Age: 64
End: 2024-11-08
Payer: COMMERCIAL

## 2024-11-08 DIAGNOSIS — M18.11 PRIMARY OSTEOARTHRITIS OF FIRST CARPOMETACARPAL JOINT OF RIGHT HAND: Primary | ICD-10-CM

## 2024-11-08 PROCEDURE — 97530 THERAPEUTIC ACTIVITIES: CPT | Mod: GO | Performed by: OCCUPATIONAL THERAPIST

## 2024-11-08 PROCEDURE — 97110 THERAPEUTIC EXERCISES: CPT | Mod: GO | Performed by: OCCUPATIONAL THERAPIST

## 2024-11-08 ASSESSMENT — PAIN - FUNCTIONAL ASSESSMENT: PAIN_FUNCTIONAL_ASSESSMENT: 0-10

## 2024-11-08 ASSESSMENT — PAIN SCALES - GENERAL: PAINLEVEL_OUTOF10: 1

## 2024-11-08 ASSESSMENT — PAIN DESCRIPTION - DESCRIPTORS: DESCRIPTORS: SHARP

## 2024-11-08 NOTE — PROGRESS NOTES
"  Occupational Therapy     Occupational Therapy Treatment  Name: Sandy Garcia   MRN: [id number]   : 1960   Date:  2024    Time Calculation  Start Time: 0800  Stop Time: 0845  Time Calculation (min): 45 min      OT Therapeutic Procedures Time Entry  Therapeutic Activity Time Entry: 20  Therapeutic Exercise Time Entry:       Current Problem:   Right CMC OA     DOI: Summer 2023  Surgical Procedure: right CMC arthroplasty   DOS: 10/04/2024    Visit Number: 4    Insurance Authorization Request: AN[employer]M RETIREE 60V PT OT COPAY 0 DED 0,COVERAGE 100 OOP 0 NO AUTH REQ     Assessment:  Patient is progressing appropriately demonstrated by improved AROM of thumb MCP/IP into flexion and opposition. She remains limited by pain and muscle weakness. Added tan putty gentle strengthening exercises to today's session to promote functional  and pinch strength. Patient will continue to benefit from skilled OT for pain management and progressive strengthening to support full return to all ADL/IADL, work, and leisure activities.       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 I am using my hand a lot now.\"    Precautions: No heavy lifting, No WB     Pain Assessment:  Location: right basal thumb   1/10 at rest, 5-6/10 at highest   Description: sharp      HEP Adherence/Understanding: Good     Objective  UE Assessment  Observation: Min to mod edema presented in left basal thumb. Incision well-healed. No sign of infection.      Palpation: n/a     Range of Motion (in degrees)  Shoulder AROM: WNL     PROM: WNL     Elbow AROM: WNL     PROM: WNL     Wrist AROM: WFL    PROM: TBT     Digits AROM: Full composite fist   PROM: TBT     Thumb AROM: MCP 0/45 (was 30), IP 0/50 (was 45), Opposition to SF basal crease (was 1 cm to RF fingertip)    " PROM: TBT      Sensation: Minimal tingling in wrist sometimes but not often     Strength: TBT   strength: R  L  Lateral pinch strength: R  L  3 point pinch strength: R  L      Treatment Interventions:   Therapeutic Exercise  Therapeutic Exercise Performed: Yes   Instructed patient to self perform active sign language in conjunction with fluido therapy  Lieberman putty gripping with forearm various position   Lieberman putty lateral pinch    Therapeutic Activity  Therapeutic Activity Performed: Yes  Beads thread  Tweezers dexterity board  Tan putty rolling           Tolerance of session: No difficulty     Outcome Measures:  Quick Dash Score: at eval 63.64%     OP EDUCATION:  Education  Individual(s) Educated: Patient  Home Program: Strengthening  Risk and Benefits Discussed with Patient/Caregiver/Other: yes  Patient/Caregiver Demonstrated Understanding: yes  Plan of Care Discussed and Agreed Upon: yes  Patient Response to Education: Patient/Caregiver Verbalized Understanding of Information, Patient/Caregiver Performed Return Demonstration of Exercises/Activities, Patient/Caregiver Asked Appropriate Questions    Goals:   1. Patient to demonstrate AROM thumb opposition to SF basal crease for dressing and grooming  2. Patient to report pain 0/10 at rest for sleeping  3. Patient to lift and carry 10# with right hand with no difficulty for work tasks  4. Patient to demonstrate proper technique and competence with HEP   5. Patient to score disability rate less than 10% on Quick DASH

## 2024-11-13 NOTE — ADDENDUM NOTE
Addended by: ANNA MICHAEL on: 11/13/2024 12:46 PM     Modules accepted: Orders     22-Nov-2022 12:13

## 2024-11-15 ENCOUNTER — APPOINTMENT (OUTPATIENT)
Dept: OCCUPATIONAL THERAPY | Facility: CLINIC | Age: 64
End: 2024-11-15
Payer: COMMERCIAL

## 2024-11-18 ENCOUNTER — HOSPITAL ENCOUNTER (OUTPATIENT)
Dept: RADIOLOGY | Facility: CLINIC | Age: 64
Discharge: HOME | End: 2024-11-18
Payer: COMMERCIAL

## 2024-11-18 ENCOUNTER — OFFICE VISIT (OUTPATIENT)
Dept: ORTHOPEDIC SURGERY | Facility: CLINIC | Age: 64
End: 2024-11-18
Payer: COMMERCIAL

## 2024-11-18 DIAGNOSIS — G89.29 CHRONIC PAIN OF RIGHT THUMB: ICD-10-CM

## 2024-11-18 DIAGNOSIS — M79.644 CHRONIC PAIN OF RIGHT THUMB: ICD-10-CM

## 2024-11-18 DIAGNOSIS — M19.049 CMC ARTHRITIS: Primary | ICD-10-CM

## 2024-11-18 PROCEDURE — 73140 X-RAY EXAM OF FINGER(S): CPT | Mod: RT

## 2024-11-18 PROCEDURE — 99211 OFF/OP EST MAY X REQ PHY/QHP: CPT | Performed by: ORTHOPAEDIC SURGERY

## 2024-11-18 PROCEDURE — 99024 POSTOP FOLLOW-UP VISIT: CPT | Performed by: ORTHOPAEDIC SURGERY

## 2024-11-18 PROCEDURE — 73140 X-RAY EXAM OF FINGER(S): CPT | Mod: RIGHT SIDE | Performed by: RADIOLOGY

## 2024-11-18 PROCEDURE — 1036F TOBACCO NON-USER: CPT | Performed by: ORTHOPAEDIC SURGERY

## 2024-11-18 NOTE — PROGRESS NOTES
11/18/2024    Chief Complaint   Patient presents with    Right Thumb - Follow-up       Cmc arthroplasty  DOS: 10/4/24  Xrays today            History of Present Illness:  Patient Sadny Garcia , 63 y.o. female, presents today, 11/18/2024, for evaluation of right thumb  CMC arthroplasty, about 6 weeks postop .  She has been well in the interim since last visit.  She reported to therapy and motion recovery.  She feels she is making gains with this but is still having some intermittent soreness and discomfort.       Review of Systems:   GENERAL: Negative  GI: Negative  MUSCULOSKELETAL: See HPI  SKIN: Negative  NEURO:  Negative     Physical Exam:  GENERAL:  Alert and oriented to person, place, and time.  No acute distress and breathing comfortably; pleasant and cooperative with the examination.  HEENT:  Head is normocephalic and atraumatic.  NECK:  Supple, no visible swelling.  CARDIOVASCULAR:  No palpable tachycardia.  LUNGS:  No audible wheezing or labored breathing.  ABDOMEN:  Nondistended.  Extremities: Evaluation of the right upper extremity finds the patient to have a palpable radial artery at the wrist with brisk capillary refill to all digits. The patient has intact sensorium to axillary, radial, median and ulnar nerves. There are no open wounds. There are no signs of infection. There is no evidence of lymphedema or lymphatic streaking. The patient has supple compartments of the right arm, forearm and hand.  Surgical incision is well-healed.  She can flex the thumb at about the level MCP flexion crease of the right small finger.     Imaging/Test Results:  3 views of the right thumb show no acute fracture dislocation.  Previous surgical resection trapezium is noted.  Adequate alignment AP lateral bleak planes.     Assessment:  Right thumb CMC arthroplasty, 6 weeks postop.     Plan:  Patient can wean from splint immobilization.  Can progress to weightbearing as tolerated under the guidance of formal therapy  for strengthening and endurance purposes per protocol.  Additional therapy requisition will be provided.  Progress to 1 to 2 pounds maximum weightbearing to the right upper extremity outside of therapy for the next 4 weeks.  At 10 weeks postop they can progress to activities as tolerated.  Follow-up with the office in 6 weeks for repeat clinical exam, we will get x-rays 3 views of the right thumb upon return.  All questions answered at today's visit.    Martha Mckeon PA-C

## 2024-11-22 ENCOUNTER — TREATMENT (OUTPATIENT)
Dept: OCCUPATIONAL THERAPY | Facility: CLINIC | Age: 64
End: 2024-11-22
Payer: COMMERCIAL

## 2024-11-22 DIAGNOSIS — M18.11 PRIMARY OSTEOARTHRITIS OF FIRST CARPOMETACARPAL JOINT OF RIGHT HAND: Primary | ICD-10-CM

## 2024-11-22 PROCEDURE — 97530 THERAPEUTIC ACTIVITIES: CPT | Mod: GO | Performed by: OCCUPATIONAL THERAPIST

## 2024-11-22 PROCEDURE — 97110 THERAPEUTIC EXERCISES: CPT | Mod: GO | Performed by: OCCUPATIONAL THERAPIST

## 2024-11-22 ASSESSMENT — PAIN DESCRIPTION - DESCRIPTORS: DESCRIPTORS: SHARP

## 2024-11-22 ASSESSMENT — PAIN SCALES - GENERAL: PAINLEVEL_OUTOF10: 1

## 2024-11-22 ASSESSMENT — PAIN - FUNCTIONAL ASSESSMENT: PAIN_FUNCTIONAL_ASSESSMENT: 0-10

## 2024-11-22 NOTE — PROGRESS NOTES
"  Occupational Therapy     Occupational Therapy Treatment  Name: Sandy Garcia   MRN: [id number]   : 1960   Date:  2024    Time Calculation  Start Time: 1150  Stop Time: 1230  Time Calculation (min): 40 min      OT Therapeutic Procedures Time Entry  Therapeutic Activity Time Entry: 20  Therapeutic Exercise Time Entry: 20      Current Problem:   Right CMC OA     DOI: Summer 2023  Surgical Procedure: right CMC arthroplasty   DOS: 10/04/2024    Visit Number: 5    Insurance Authorization Request: AN[employer] RETIREE 60V PT OT COPAY 0 DED 0,COVERAGE 100 OOP 0 NO AUTH REQ     Assessment:  Patient is progressing appropriately demonstrated by improved functional use of right hand. Added 1# wrist strengthening exercises to today's session to promote extrinsic muscle strength to improve thumb and wrist joint stability. Patient will continue to benefit from skilled OT for pain management and progressive strengthening to support full return to all ADL/IADL, work, and leisure activities.       Plan:  Outpatient Plan  OT Plan: Progress resistance training  Frequency: biweekly  Duration: 4 weeks  Rehab Potential: Good  Plan of Care Agreement: Patient      Subjective   General: \"I am using my hand a lot. It still hurts and sometimes it still feels weak but overall I'm doing good.\"    Precautions: No heavy lifting, No WB     Pain Assessment:  Location: right basal thumb   1/10 at rest, 5/10 at highest (was 5-6)  Description: sharp      HEP Adherence/Understanding: Good     Objective  UE Assessment  Observation: Min to mod edema presented in left basal thumb. Incision well-healed. No sign of infection.      Palpation: n/a     Range of Motion (in degrees)  Shoulder AROM: WNL     PROM: WNL     Elbow AROM: WNL     PROM: WNL     Wrist AROM: WFL    PROM: TBT     Digits AROM: Full composite fist   PROM: TBT     Thumb AROM: MCP 0/45, IP 0/50, Opposition to SF basal crease    PROM: TBT      Sensation: Minimal tingling in wrist " sometimes but not often     Strength:    strength: R 35#,  L 60#  Lateral pinch strength: R 9#,  L 16#  3 point pinch strength: R 9#,  L 18#      Treatment Interventions:   Therapeutic Exercise  Therapeutic Exercise Performed: Yes   Instructed patient to self perform active sign language in conjunction with fluido therapy  Lieberman putty gripping with forearm various position   1# free weight wrist flex/ext/sup/pro    Therapeutic Activity  Therapeutic Activity Performed: Yes  Tan putty small pegs scavenge last  Lieberman putty rolling and pressing           Tolerance of session: No difficulty     Outcome Measures:  Quick Dash Score: at eval 63.64%     OP EDUCATION:  Education  Individual(s) Educated: Patient  Home Program: Strengthening  Risk and Benefits Discussed with Patient/Caregiver/Other: yes  Patient/Caregiver Demonstrated Understanding: yes  Plan of Care Discussed and Agreed Upon: yes  Patient Response to Education: Patient/Caregiver Verbalized Understanding of Information, Patient/Caregiver Performed Return Demonstration of Exercises/Activities, Patient/Caregiver Asked Appropriate Questions    Goals:   1. Patient to demonstrate AROM thumb opposition to SF basal crease for dressing and grooming  2. Patient to report pain 0/10 at rest for sleeping  3. Patient to lift and carry 10# with right hand with no difficulty for work tasks  4. Patient to demonstrate proper technique and competence with HEP   5. Patient to score disability rate less than 10% on Quick DASH

## 2024-12-04 ENCOUNTER — TREATMENT (OUTPATIENT)
Dept: OCCUPATIONAL THERAPY | Facility: CLINIC | Age: 64
End: 2024-12-04
Payer: COMMERCIAL

## 2024-12-04 DIAGNOSIS — M18.11 PRIMARY OSTEOARTHRITIS OF FIRST CARPOMETACARPAL JOINT OF RIGHT HAND: Primary | ICD-10-CM

## 2024-12-04 PROCEDURE — 97530 THERAPEUTIC ACTIVITIES: CPT | Mod: GO | Performed by: OCCUPATIONAL THERAPIST

## 2024-12-04 PROCEDURE — 97110 THERAPEUTIC EXERCISES: CPT | Mod: GO | Performed by: OCCUPATIONAL THERAPIST

## 2024-12-04 ASSESSMENT — PAIN SCALES - GENERAL: PAINLEVEL_OUTOF10: 0 - NO PAIN

## 2024-12-04 ASSESSMENT — PAIN - FUNCTIONAL ASSESSMENT: PAIN_FUNCTIONAL_ASSESSMENT: 0-10

## 2024-12-04 NOTE — PROGRESS NOTES
"  Occupational Therapy     Occupational Therapy Treatment  Name: Sandy Garcia   MRN: [id number]   : 1960   Date:  2024    Time Calculation  Start Time: 425  Stop Time: 503  Time Calculation (min): 38 min      OT Therapeutic Procedures Time Entry  Therapeutic Activity Time Entry: 18  Therapeutic Exercise Time Entry: 20      Current Problem:   Right CMC OA     DOI: Summer 2023  Surgical Procedure: right CMC arthroplasty   DOS: 10/04/2024    Visit Number: 6    Insurance Authorization Request: AN[employer]M RETIREE 60V PT OT COPAY 0 DED 0,COVERAGE 100 OOP 0 NO AUTH REQ     Assessment:  Patient is progressing appropriately demonstrated by improved  strength. Progressed resistance training to red putty today to promote extrinsic muscle strength. Patient will continue to benefit from skilled OT for pain management and progressive strengthening to support full return to all ADL/IADL, work, and leisure activities.       Plan:  Outpatient Plan  OT Plan: Progress resistance training  Frequency: See patient back in 2 weeks to check on progress  Rehab Potential: Good  Plan of Care Agreement: Patient      Subjective   General: \"I would say that I am 85% functional. Still weak. I can write but just doesn't feel totally normal yet.\"    Precautions: No heavy lifting, No WB     Pain Assessment:  Location: right basal thumb   0/10 at rest (was 1), 2/10 at highest (was 5)  Description: sharp      HEP Adherence/Understanding: Good     Objective  UE Assessment  Observation: Min to mod edema presented in left basal thumb. Incision well-healed. No sign of infection.      Palpation: n/a     Range of Motion (in degrees)  Shoulder AROM: WNL     PROM: WNL     Elbow AROM: WNL     PROM: WNL     Wrist AROM: WFL    PROM: TBT     Digits AROM: Full composite fist   PROM: TBT     Thumb AROM: MCP 0/45, IP 0/50, Opposition to SF basal crease    PROM: TBT      Sensation: Minimal tingling in wrist sometimes but not often     Strength: "    strength: R 50# (was 35#),  L 60#  Lateral pinch strength: R 9#,  L 16#  3 point pinch strength: R 9#,  L 18#      Treatment Interventions:   Therapeutic Exercise  Therapeutic Exercise Performed: Yes   Instructed patient to self perform active sign language in conjunction with fluido therapy  Red putty gripping with forearm various position, lateral pinch, 3 point pinch  Red flexbar twist 2 ways, bend 2 ways    Therapeutic Activity  Therapeutic Activity Performed: Yes  Tan putty rolling and pressing  Weighted ball toss 2.2 lb 2 ways           Tolerance of session: No difficulty     Outcome Measures:  Quick Dash Score: at eval 63.64%     OP EDUCATION:  Education  Individual(s) Educated: Patient  Home Program: Strengthening  Risk and Benefits Discussed with Patient/Caregiver/Other: yes  Patient/Caregiver Demonstrated Understanding: yes  Plan of Care Discussed and Agreed Upon: yes  Patient Response to Education: Patient/Caregiver Verbalized Understanding of Information, Patient/Caregiver Performed Return Demonstration of Exercises/Activities, Patient/Caregiver Asked Appropriate Questions    Goals:   1. Patient to demonstrate AROM thumb opposition to SF basal crease for dressing and grooming  2. Patient to report pain 0/10 at rest for sleeping  3. Patient to lift and carry 10# with right hand with no difficulty for work tasks  4. Patient to demonstrate proper technique and competence with HEP   5. Patient to score disability rate less than 10% on Quick DASH

## 2024-12-18 ENCOUNTER — TREATMENT (OUTPATIENT)
Dept: OCCUPATIONAL THERAPY | Facility: CLINIC | Age: 64
End: 2024-12-18
Payer: COMMERCIAL

## 2024-12-18 DIAGNOSIS — M18.11 PRIMARY OSTEOARTHRITIS OF FIRST CARPOMETACARPAL JOINT OF RIGHT HAND: Primary | ICD-10-CM

## 2024-12-18 PROCEDURE — 97110 THERAPEUTIC EXERCISES: CPT | Mod: GO | Performed by: OCCUPATIONAL THERAPIST

## 2024-12-18 ASSESSMENT — PAIN SCALES - GENERAL: PAINLEVEL_OUTOF10: 0 - NO PAIN

## 2024-12-18 ASSESSMENT — PAIN - FUNCTIONAL ASSESSMENT: PAIN_FUNCTIONAL_ASSESSMENT: 0-10

## 2024-12-18 NOTE — PROGRESS NOTES
"Occupational   Occupational Therapy     Occupational Therapy Treatment  Name: Sandy Garcia   MRN: [id number]   : 1960   Date:  2024    Time Calculation  Start Time: 1100  Stop Time: 1130  Time Calculation (min): 30 min      OT Therapeutic Procedures Time Entry  Therapeutic Exercise Time Entry: 30      Current Problem:   Right CMC OA     DOI: Summer 2023  Surgical Procedure: right CMC arthroplasty   DOS: 10/04/2024    Visit Number: 7    Insurance Authorization Request: [employer] 60V PT OT COPAY 0 DED 0,COVERAGE 100 OOP 0 NO AUTH REQ     Assessment:  Patient demonstrates thumb AROM WFL and increased /pinch strength. Progressed resistance training to green putty today and reviewed HEP with patient to continue on strengthening exercises on her own. Patient verbalized and demonstrated understanding. All questions were answered. Discharge patient at this time.     Plan:  Outpatient Plan  OT Plan: Discharge patient to HEP today  Rehab Potential: Good  Plan of Care Agreement: Patient      Subjective   General: \"I am doing everything I need to do. My thumb still feels tight but I can move just fine.\"    Precautions: No heavy lifting, No WB     Pain Assessment:  Location: n/a  0/10 at rest, 0/10 at highest   Description: n/a    HEP Adherence/Understanding: Good     Objective  UE Assessment  Observation: Min to mod edema presented in left basal thumb. Incision well-healed. No sign of infection.      Palpation: n/a     Range of Motion (in degrees)  Shoulder AROM: WNL     PROM: WNL     Elbow AROM: WNL     PROM: WNL     Wrist AROM: WFL    PROM: TBT     Digits AROM: Full composite fist   PROM: TBT     Thumb AROM: MCP 0/45, IP 0/50, Opposition to SF basal crease    PROM: TBT      Sensation: Minimal tingling in wrist sometimes but not often     Strength:    strength: R 52# (was 50#),  L 60#  Lateral pinch strength: R 10# (was 9#),  L 16#  3 point pinch strength: R 9#,  L 18#      Treatment " Interventions:   Therapeutic Exercise  Therapeutic Exercise Performed: Yes   Instructed patient to self perform active sign language in conjunction with fluido therapy  green putty gripping with forearm various position, lateral pinch, 3 point pinch  Green putty rolling and pressing            Tolerance of session: No difficulty     Outcome Measures:  Quick Dash Score: at eval 63.64%, at discharge 22.73%     OP EDUCATION:  Education  Individual(s) Educated: Patient  Home Program: Strengthening  Risk and Benefits Discussed with Patient/Caregiver/Other: yes  Patient/Caregiver Demonstrated Understanding: yes  Plan of Care Discussed and Agreed Upon: yes  Patient Response to Education: Patient/Caregiver Verbalized Understanding of Information, Patient/Caregiver Performed Return Demonstration of Exercises/Activities, Patient/Caregiver Asked Appropriate Questions    Goals:   1. Patient to demonstrate AROM thumb opposition to SF basal crease for dressing and grooming (met)  2. Patient to report pain 0/10 at rest for sleeping (met)  3. Patient to lift and carry 10# with right hand with no difficulty for work tasks (ongoing)  4. Patient to demonstrate proper technique and competence with HEP  (met)  5. Patient to score disability rate less than 10% on Quick DASH (ongoing)

## 2024-12-30 ENCOUNTER — OFFICE VISIT (OUTPATIENT)
Dept: ORTHOPEDIC SURGERY | Facility: CLINIC | Age: 64
End: 2024-12-30
Payer: COMMERCIAL

## 2024-12-30 ENCOUNTER — HOSPITAL ENCOUNTER (OUTPATIENT)
Dept: RADIOLOGY | Facility: CLINIC | Age: 64
Discharge: HOME | End: 2024-12-30
Payer: COMMERCIAL

## 2024-12-30 DIAGNOSIS — G89.29 CHRONIC PAIN OF RIGHT THUMB: ICD-10-CM

## 2024-12-30 DIAGNOSIS — G89.29 CHRONIC PAIN OF RIGHT THUMB: Primary | ICD-10-CM

## 2024-12-30 DIAGNOSIS — M79.644 CHRONIC PAIN OF RIGHT THUMB: ICD-10-CM

## 2024-12-30 DIAGNOSIS — M19.049 CMC ARTHRITIS: ICD-10-CM

## 2024-12-30 DIAGNOSIS — M79.644 CHRONIC PAIN OF RIGHT THUMB: Primary | ICD-10-CM

## 2024-12-30 PROCEDURE — 73140 X-RAY EXAM OF FINGER(S): CPT | Mod: RIGHT SIDE | Performed by: STUDENT IN AN ORGANIZED HEALTH CARE EDUCATION/TRAINING PROGRAM

## 2024-12-30 PROCEDURE — 73140 X-RAY EXAM OF FINGER(S): CPT | Mod: RT

## 2024-12-30 PROCEDURE — 99211 OFF/OP EST MAY X REQ PHY/QHP: CPT | Performed by: ORTHOPAEDIC SURGERY

## 2024-12-30 NOTE — LETTER
December 30, 2024     Patient: Sandy Garcia   YOB: 1960   Date of Visit: 12/30/2024       To Whom It May Concern:    It is my medical opinion that Sandy Garcia may return to work on 1/6/2025 with no restirctions .    If you have any questions or concerns, please don't hesitate to call.         Sincerely,        Jim Douglass, DO

## 2024-12-30 NOTE — PROGRESS NOTES
12/30/2024    Chief Complaint   Patient presents with    Right Thumb - Follow-up       Cmc arthroplasty  DOS: 10/4/24  Xrays today            History of Present Illness:  Patient Sandy Garcia , 64 y.o. female, presents today, 12/30/2024, for evaluation of right thumb  CMC arthroplasty, 10 weeks postop.  She is done well in the interim since last visit.  She completed her formal course of therapy and was discharged home program last week.  She states she is doing great, she is returned to most of her normal activities and is tolerating this well.  She has some occasional soreness and stiffness but this loosens up and improves throughout the day.  No new injury or trauma.  She is inquiring about returning to work next week on Monday without restrictions .         Review of Systems:   GENERAL: Negative  GI: Negative  MUSCULOSKELETAL: See HPI  SKIN: Negative  NEURO:  Negative     Physical Exam:  GENERAL:  Alert and oriented to person, place, and time.  No acute distress and breathing comfortably; pleasant and cooperative with the examination.  HEENT:  Head is normocephalic and atraumatic.  NECK:  Supple, no visible swelling.  CARDIOVASCULAR:  No palpable tachycardia.  LUNGS:  No audible wheezing or labored breathing.  ABDOMEN:  Nondistended.  Extremities: Evaluation of the right upper extremity finds the patient to have a palpable radial artery at the wrist with brisk capillary refill to all digits. The patient has intact sensorium to axillary, radial, median and ulnar nerves. There are no open wounds. There are no signs of infection. There is no evidence of lymphedema or lymphatic streaking. The patient has supple compartments of the right arm, forearm and hand.  Surgical incision is well-healed.  She has full composite fist.  She can flex thumb level A1 pulley of the right small finger.     Imaging/Test Results:  3 views of the thumb show no acute fracture or dislocation.  Surgical resection trapezium is  noted.  Good alignment throughout all 3 planes.  No evidence of metacarpal subsidence.     Assessment:  Right thumb CMC arthroplasty, 10 weeks postop doing great.     Plan:  Patient can return to work and activities without restriction.  Give her a note to return on 1/6/2025 with no restrictions.  Will make sure short-term disability paperwork is updated as well.  Continue with weightbearing as tolerated and home exercise program.  Follow-up with our office in as-needed basis as symptoms dictate on the left side.  We do anticipate return in the future.  All questions answered today's visit.    Martha Mckeon PA-C